# Patient Record
Sex: MALE | Race: AMERICAN INDIAN OR ALASKA NATIVE
[De-identification: names, ages, dates, MRNs, and addresses within clinical notes are randomized per-mention and may not be internally consistent; named-entity substitution may affect disease eponyms.]

---

## 2019-11-25 ENCOUNTER — HOSPITAL ENCOUNTER (EMERGENCY)
Dept: HOSPITAL 5 - ED | Age: 59
Discharge: HOME | End: 2019-11-25
Payer: MEDICAID

## 2019-11-25 VITALS — DIASTOLIC BLOOD PRESSURE: 86 MMHG | SYSTOLIC BLOOD PRESSURE: 114 MMHG
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
## 2019-11-25 DIAGNOSIS — S96.911A: Primary | ICD-10-CM
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
## 2019-11-25 DIAGNOSIS — S76.011A: ICD-10-CM

## 2019-11-25 NOTE — XRAY REPORT
Right hip 3 views



INDICATION / CLINICAL INFORMATION:

pain s/p fall



COMPARISON:

None available.

 

FINDINGS:



BONES / JOINT(S): No acute fracture or subluxation. Previous placement of a compression screw at the 
left hip. Mild/moderate DJD right hip.

SOFT TISSUES: No significant abnormality.



ADDITIONAL FINDINGS: None.







Signer Name: Hernan Wills MD 

Signed: 11/25/2019 1:01 PM

 Workstation Name: [id number]

## 2019-11-25 NOTE — XRAY REPORT
RIGHT ANKLE 3 VIEWS



INDICATION / CLINICAL INFORMATION:

Right ankle pain after fall.



COMPARISON:

None available.

 

FINDINGS:



BONES and JOINT(S): No acute fracture or subluxation. No significant arthritis.

SOFT TISSUES: No acute abnormality is seen. Generalized atherosclerosis is present along the distal l
eg and ankle.



ADDITIONAL FINDINGS: None.



IMPRESSION:

No acute abnormality of the right ankle.



Signer Name: Olivier Scott MD 

Signed: 11/25/2019 1:01 PM

 Workstation Name: [id number]